# Patient Record
Sex: MALE | Race: WHITE | NOT HISPANIC OR LATINO | Employment: FULL TIME | URBAN - METROPOLITAN AREA
[De-identification: names, ages, dates, MRNs, and addresses within clinical notes are randomized per-mention and may not be internally consistent; named-entity substitution may affect disease eponyms.]

---

## 2018-01-12 ENCOUNTER — HOSPITAL ENCOUNTER (OUTPATIENT)
Dept: LAB | Facility: MEDICAL CENTER | Age: 53
End: 2018-01-12
Attending: FAMILY MEDICINE
Payer: COMMERCIAL

## 2018-01-12 LAB
ALBUMIN SERPL BCP-MCNC: 4.9 G/DL (ref 3.2–4.9)
ALBUMIN/GLOB SERPL: 1.8 G/DL
ALP SERPL-CCNC: 54 U/L (ref 30–99)
ALT SERPL-CCNC: 35 U/L (ref 2–50)
ANION GAP SERPL CALC-SCNC: 7 MMOL/L (ref 0–11.9)
AST SERPL-CCNC: 25 U/L (ref 12–45)
BASOPHILS # BLD AUTO: 1.4 % (ref 0–1.8)
BASOPHILS # BLD: 0.05 K/UL (ref 0–0.12)
BILIRUB SERPL-MCNC: 1.3 MG/DL (ref 0.1–1.5)
BUN SERPL-MCNC: 18 MG/DL (ref 8–22)
CALCIUM SERPL-MCNC: 10 MG/DL (ref 8.5–10.5)
CHLORIDE SERPL-SCNC: 106 MMOL/L (ref 96–112)
CHOLEST SERPL-MCNC: 189 MG/DL (ref 100–199)
CO2 SERPL-SCNC: 28 MMOL/L (ref 20–33)
CREAT SERPL-MCNC: 0.99 MG/DL (ref 0.5–1.4)
EOSINOPHIL # BLD AUTO: 0.03 K/UL (ref 0–0.51)
EOSINOPHIL NFR BLD: 0.8 % (ref 0–6.9)
ERYTHROCYTE [DISTWIDTH] IN BLOOD BY AUTOMATED COUNT: 41.7 FL (ref 35.9–50)
EST. AVERAGE GLUCOSE BLD GHB EST-MCNC: 91 MG/DL
GLOBULIN SER CALC-MCNC: 2.7 G/DL (ref 1.9–3.5)
GLUCOSE SERPL-MCNC: 88 MG/DL (ref 65–99)
HBA1C MFR BLD: 4.8 % (ref 0–5.6)
HCT VFR BLD AUTO: 48.5 % (ref 42–52)
HCV AB SER QL: NEGATIVE
HDLC SERPL-MCNC: 38 MG/DL
HGB BLD-MCNC: 16.6 G/DL (ref 14–18)
IMM GRANULOCYTES # BLD AUTO: 0.01 K/UL (ref 0–0.11)
IMM GRANULOCYTES NFR BLD AUTO: 0.3 % (ref 0–0.9)
LDLC SERPL CALC-MCNC: 127 MG/DL
LYMPHOCYTES # BLD AUTO: 1.44 K/UL (ref 1–4.8)
LYMPHOCYTES NFR BLD: 39.7 % (ref 22–41)
MCH RBC QN AUTO: 32.4 PG (ref 27–33)
MCHC RBC AUTO-ENTMCNC: 34.2 G/DL (ref 33.7–35.3)
MCV RBC AUTO: 94.7 FL (ref 81.4–97.8)
MONOCYTES # BLD AUTO: 0.32 K/UL (ref 0–0.85)
MONOCYTES NFR BLD AUTO: 8.8 % (ref 0–13.4)
NEUTROPHILS # BLD AUTO: 1.78 K/UL (ref 1.82–7.42)
NEUTROPHILS NFR BLD: 49 % (ref 44–72)
NRBC # BLD AUTO: 0 K/UL
NRBC BLD-RTO: 0 /100 WBC
PLATELET # BLD AUTO: 150 K/UL (ref 164–446)
PMV BLD AUTO: 11.7 FL (ref 9–12.9)
POTASSIUM SERPL-SCNC: 4.3 MMOL/L (ref 3.6–5.5)
PROT SERPL-MCNC: 7.6 G/DL (ref 6–8.2)
PSA SERPL-MCNC: 0.9 NG/ML (ref 0–4)
RBC # BLD AUTO: 5.12 M/UL (ref 4.7–6.1)
SODIUM SERPL-SCNC: 141 MMOL/L (ref 135–145)
TRIGL SERPL-MCNC: 119 MG/DL (ref 0–149)
TSH SERPL DL<=0.005 MIU/L-ACNC: 2.2 UIU/ML (ref 0.38–5.33)
WBC # BLD AUTO: 3.6 K/UL (ref 4.8–10.8)

## 2018-01-12 PROCEDURE — 81003 URINALYSIS AUTO W/O SCOPE: CPT

## 2018-01-12 PROCEDURE — 84270 ASSAY OF SEX HORMONE GLOBUL: CPT

## 2018-01-12 PROCEDURE — 84403 ASSAY OF TOTAL TESTOSTERONE: CPT

## 2018-01-12 PROCEDURE — 84402 ASSAY OF FREE TESTOSTERONE: CPT

## 2018-01-12 PROCEDURE — 86803 HEPATITIS C AB TEST: CPT

## 2018-01-12 PROCEDURE — 83036 HEMOGLOBIN GLYCOSYLATED A1C: CPT

## 2018-01-12 PROCEDURE — 84443 ASSAY THYROID STIM HORMONE: CPT

## 2018-01-12 PROCEDURE — 84153 ASSAY OF PSA TOTAL: CPT

## 2018-01-12 PROCEDURE — 80053 COMPREHEN METABOLIC PANEL: CPT

## 2018-01-12 PROCEDURE — 80061 LIPID PANEL: CPT

## 2018-01-12 PROCEDURE — 85025 COMPLETE CBC W/AUTO DIFF WBC: CPT

## 2018-01-12 PROCEDURE — 36415 COLL VENOUS BLD VENIPUNCTURE: CPT

## 2018-01-13 LAB
APPEARANCE UR: CLEAR
BILIRUB UR QL STRIP.AUTO: NEGATIVE
COLOR UR: YELLOW
GLUCOSE UR STRIP.AUTO-MCNC: NEGATIVE MG/DL
KETONES UR STRIP.AUTO-MCNC: NEGATIVE MG/DL
LEUKOCYTE ESTERASE UR QL STRIP.AUTO: NEGATIVE
MICRO URNS: NORMAL
NITRITE UR QL STRIP.AUTO: NEGATIVE
PH UR STRIP.AUTO: 5 [PH]
PROT UR QL STRIP: NEGATIVE MG/DL
RBC UR QL AUTO: NEGATIVE
SP GR UR STRIP.AUTO: 1.02
UROBILINOGEN UR STRIP.AUTO-MCNC: 0.2 MG/DL

## 2018-01-15 LAB
SHBG SERPL-SCNC: 53 NMOL/L (ref 11–80)
TESTOST FREE MFR SERPL: 1.4 % (ref 1.6–2.9)
TESTOST FREE SERPL-MCNC: 63 PG/ML (ref 47–244)
TESTOST SERPL-MCNC: 449 NG/DL (ref 300–890)

## 2020-05-20 SDOH — HEALTH STABILITY: MENTAL HEALTH: HOW MANY STANDARD DRINKS CONTAINING ALCOHOL DO YOU HAVE ON A TYPICAL DAY?: 3 OR 4

## 2020-05-20 NOTE — OR NURSING
PreAdmit Appointment: Patient given Preparing for your procedure handout. Patient instructed to continue regularly prescribed medications through day before surgery. Instructed to take the following medications the day of surgery with a sip of water per Anesthesia protocol: Patient takes no medications. Denies issues with anesthesia. Education provided. Covid scheduled for 5/26.

## 2020-05-26 ENCOUNTER — OFFICE VISIT (OUTPATIENT)
Dept: ADMISSIONS | Facility: MEDICAL CENTER | Age: 55
End: 2020-05-26
Attending: ORTHOPAEDIC SURGERY
Payer: COMMERCIAL

## 2020-05-26 DIAGNOSIS — Z01.812 PRE-OPERATIVE LABORATORY EXAMINATION: ICD-10-CM

## 2020-05-26 LAB — COVID ORDER STATUS COVID19: NORMAL

## 2020-05-26 PROCEDURE — C9803 HOPD COVID-19 SPEC COLLECT: HCPCS

## 2020-05-27 LAB
SARS-COV-2 RNA RESP QL NAA+PROBE: NOT DETECTED
SPECIMEN SOURCE: NORMAL

## 2020-05-29 ENCOUNTER — ANESTHESIA (OUTPATIENT)
Dept: SURGERY | Facility: MEDICAL CENTER | Age: 55
End: 2020-05-29
Payer: COMMERCIAL

## 2020-05-29 ENCOUNTER — HOSPITAL ENCOUNTER (OUTPATIENT)
Facility: MEDICAL CENTER | Age: 55
End: 2020-05-29
Attending: ORTHOPAEDIC SURGERY | Admitting: ORTHOPAEDIC SURGERY
Payer: COMMERCIAL

## 2020-05-29 ENCOUNTER — ANESTHESIA EVENT (OUTPATIENT)
Dept: SURGERY | Facility: MEDICAL CENTER | Age: 55
End: 2020-05-29
Payer: COMMERCIAL

## 2020-05-29 VITALS
RESPIRATION RATE: 16 BRPM | SYSTOLIC BLOOD PRESSURE: 103 MMHG | HEIGHT: 75 IN | OXYGEN SATURATION: 92 % | DIASTOLIC BLOOD PRESSURE: 64 MMHG | BODY MASS INDEX: 28.37 KG/M2 | WEIGHT: 228.18 LBS | HEART RATE: 63 BPM | TEMPERATURE: 97.5 F

## 2020-05-29 PROCEDURE — A9270 NON-COVERED ITEM OR SERVICE: HCPCS

## 2020-05-29 PROCEDURE — 502581 HCHG PACK, SHOULDER ARTHROSCOPY: Performed by: ORTHOPAEDIC SURGERY

## 2020-05-29 PROCEDURE — 160025 RECOVERY II MINUTES (STATS): Performed by: ORTHOPAEDIC SURGERY

## 2020-05-29 PROCEDURE — 700102 HCHG RX REV CODE 250 W/ 637 OVERRIDE(OP)

## 2020-05-29 PROCEDURE — 160029 HCHG SURGERY MINUTES - 1ST 30 MINS LEVEL 4: Performed by: ORTHOPAEDIC SURGERY

## 2020-05-29 PROCEDURE — 700101 HCHG RX REV CODE 250: Performed by: ORTHOPAEDIC SURGERY

## 2020-05-29 PROCEDURE — 501445 HCHG STAPLER, SKIN DISP: Performed by: ORTHOPAEDIC SURGERY

## 2020-05-29 PROCEDURE — 700111 HCHG RX REV CODE 636 W/ 250 OVERRIDE (IP): Performed by: ANESTHESIOLOGY

## 2020-05-29 PROCEDURE — 502240 HCHG MISC OR SUPPLY RC 0272: Performed by: ORTHOPAEDIC SURGERY

## 2020-05-29 PROCEDURE — 160046 HCHG PACU - 1ST 60 MINS PHASE II: Performed by: ORTHOPAEDIC SURGERY

## 2020-05-29 PROCEDURE — 160048 HCHG OR STATISTICAL LEVEL 1-5: Performed by: ORTHOPAEDIC SURGERY

## 2020-05-29 PROCEDURE — 500562 HCHG FIBERWIRE: Performed by: ORTHOPAEDIC SURGERY

## 2020-05-29 PROCEDURE — 160036 HCHG PACU - EA ADDL 30 MINS PHASE I: Performed by: ORTHOPAEDIC SURGERY

## 2020-05-29 PROCEDURE — A9270 NON-COVERED ITEM OR SERVICE: HCPCS | Performed by: ORTHOPAEDIC SURGERY

## 2020-05-29 PROCEDURE — 160035 HCHG PACU - 1ST 60 MINS PHASE I: Performed by: ORTHOPAEDIC SURGERY

## 2020-05-29 PROCEDURE — 500028 HCHG ARTHROWAND TURBOVAC 3.5/90 SUCT.: Performed by: ORTHOPAEDIC SURGERY

## 2020-05-29 PROCEDURE — 700111 HCHG RX REV CODE 636 W/ 250 OVERRIDE (IP): Performed by: ORTHOPAEDIC SURGERY

## 2020-05-29 PROCEDURE — 160041 HCHG SURGERY MINUTES - EA ADDL 1 MIN LEVEL 4: Performed by: ORTHOPAEDIC SURGERY

## 2020-05-29 PROCEDURE — 501838 HCHG SUTURE GENERAL: Performed by: ORTHOPAEDIC SURGERY

## 2020-05-29 PROCEDURE — 160009 HCHG ANES TIME/MIN: Performed by: ORTHOPAEDIC SURGERY

## 2020-05-29 PROCEDURE — 700102 HCHG RX REV CODE 250 W/ 637 OVERRIDE(OP): Performed by: ORTHOPAEDIC SURGERY

## 2020-05-29 PROCEDURE — 700101 HCHG RX REV CODE 250: Performed by: ANESTHESIOLOGY

## 2020-05-29 PROCEDURE — 160002 HCHG RECOVERY MINUTES (STAT): Performed by: ORTHOPAEDIC SURGERY

## 2020-05-29 PROCEDURE — 500144 HCHG CANNULA KIT, UNIV GREY-SHOULDER: Performed by: ORTHOPAEDIC SURGERY

## 2020-05-29 PROCEDURE — 700105 HCHG RX REV CODE 258: Performed by: ORTHOPAEDIC SURGERY

## 2020-05-29 RX ORDER — GABAPENTIN 300 MG/1
CAPSULE ORAL
Status: COMPLETED
Start: 2020-05-29 | End: 2020-05-29

## 2020-05-29 RX ORDER — ONDANSETRON 2 MG/ML
INJECTION INTRAMUSCULAR; INTRAVENOUS PRN
Status: DISCONTINUED | OUTPATIENT
Start: 2020-05-29 | End: 2020-05-29 | Stop reason: SURG

## 2020-05-29 RX ORDER — MORPHINE SULFATE 10 MG/ML
INJECTION, SOLUTION INTRAMUSCULAR; INTRAVENOUS
Status: DISCONTINUED | OUTPATIENT
Start: 2020-05-29 | End: 2020-05-29 | Stop reason: HOSPADM

## 2020-05-29 RX ORDER — SODIUM CHLORIDE, SODIUM LACTATE, POTASSIUM CHLORIDE, CALCIUM CHLORIDE 600; 310; 30; 20 MG/100ML; MG/100ML; MG/100ML; MG/100ML
INJECTION, SOLUTION INTRAVENOUS CONTINUOUS
Status: DISCONTINUED | OUTPATIENT
Start: 2020-05-29 | End: 2020-05-29 | Stop reason: HOSPADM

## 2020-05-29 RX ORDER — ROPIVACAINE HYDROCHLORIDE 5 MG/ML
INJECTION, SOLUTION EPIDURAL; INFILTRATION; PERINEURAL
Status: DISCONTINUED | OUTPATIENT
Start: 2020-05-29 | End: 2020-05-29 | Stop reason: HOSPADM

## 2020-05-29 RX ORDER — SODIUM CHLORIDE 9 MG/ML
INJECTION, SOLUTION INTRAMUSCULAR; INTRAVENOUS; SUBCUTANEOUS
Status: DISCONTINUED | OUTPATIENT
Start: 2020-05-29 | End: 2020-05-29 | Stop reason: HOSPADM

## 2020-05-29 RX ORDER — LIDOCAINE HYDROCHLORIDE 20 MG/ML
INJECTION, SOLUTION EPIDURAL; INFILTRATION; INTRACAUDAL; PERINEURAL PRN
Status: DISCONTINUED | OUTPATIENT
Start: 2020-05-29 | End: 2020-05-29 | Stop reason: SURG

## 2020-05-29 RX ORDER — OXYCODONE HCL 5 MG/5 ML
10 SOLUTION, ORAL ORAL
Status: DISCONTINUED | OUTPATIENT
Start: 2020-05-29 | End: 2020-05-29 | Stop reason: HOSPADM

## 2020-05-29 RX ORDER — ACETAMINOPHEN 500 MG
1000 TABLET ORAL ONCE
Status: DISCONTINUED | OUTPATIENT
Start: 2020-05-29 | End: 2020-05-29 | Stop reason: HOSPADM

## 2020-05-29 RX ORDER — ONDANSETRON 2 MG/ML
4 INJECTION INTRAMUSCULAR; INTRAVENOUS
Status: DISCONTINUED | OUTPATIENT
Start: 2020-05-29 | End: 2020-05-29 | Stop reason: HOSPADM

## 2020-05-29 RX ORDER — SUCCINYLCHOLINE/SOD CL,ISO/PF 200MG/10ML
SYRINGE (ML) INTRAVENOUS PRN
Status: DISCONTINUED | OUTPATIENT
Start: 2020-05-29 | End: 2020-05-29 | Stop reason: SURG

## 2020-05-29 RX ORDER — CEFAZOLIN SODIUM 1 G/3ML
INJECTION, POWDER, FOR SOLUTION INTRAMUSCULAR; INTRAVENOUS PRN
Status: DISCONTINUED | OUTPATIENT
Start: 2020-05-29 | End: 2020-05-29 | Stop reason: SURG

## 2020-05-29 RX ORDER — OXYCODONE HCL 5 MG/5 ML
5 SOLUTION, ORAL ORAL
Status: DISCONTINUED | OUTPATIENT
Start: 2020-05-29 | End: 2020-05-29 | Stop reason: HOSPADM

## 2020-05-29 RX ORDER — DEXAMETHASONE SODIUM PHOSPHATE 4 MG/ML
INJECTION, SOLUTION INTRA-ARTICULAR; INTRALESIONAL; INTRAMUSCULAR; INTRAVENOUS; SOFT TISSUE PRN
Status: DISCONTINUED | OUTPATIENT
Start: 2020-05-29 | End: 2020-05-29 | Stop reason: SURG

## 2020-05-29 RX ORDER — DIPHENHYDRAMINE HYDROCHLORIDE 50 MG/ML
12.5 INJECTION INTRAMUSCULAR; INTRAVENOUS
Status: DISCONTINUED | OUTPATIENT
Start: 2020-05-29 | End: 2020-05-29 | Stop reason: HOSPADM

## 2020-05-29 RX ORDER — LABETALOL HYDROCHLORIDE 5 MG/ML
5 INJECTION, SOLUTION INTRAVENOUS
Status: DISCONTINUED | OUTPATIENT
Start: 2020-05-29 | End: 2020-05-29 | Stop reason: HOSPADM

## 2020-05-29 RX ORDER — MEPERIDINE HYDROCHLORIDE 25 MG/ML
INJECTION INTRAMUSCULAR; INTRAVENOUS; SUBCUTANEOUS PRN
Status: DISCONTINUED | OUTPATIENT
Start: 2020-05-29 | End: 2020-05-29 | Stop reason: SURG

## 2020-05-29 RX ORDER — HALOPERIDOL 5 MG/ML
1 INJECTION INTRAMUSCULAR
Status: DISCONTINUED | OUTPATIENT
Start: 2020-05-29 | End: 2020-05-29 | Stop reason: HOSPADM

## 2020-05-29 RX ORDER — GABAPENTIN 300 MG/1
300 CAPSULE ORAL ONCE
Status: DISCONTINUED | OUTPATIENT
Start: 2020-05-29 | End: 2020-05-29 | Stop reason: HOSPADM

## 2020-05-29 RX ORDER — MEPERIDINE HYDROCHLORIDE 25 MG/ML
25 INJECTION INTRAMUSCULAR; INTRAVENOUS; SUBCUTANEOUS
Status: DISCONTINUED | OUTPATIENT
Start: 2020-05-29 | End: 2020-05-29 | Stop reason: HOSPADM

## 2020-05-29 RX ORDER — KETOROLAC TROMETHAMINE 30 MG/ML
INJECTION, SOLUTION INTRAMUSCULAR; INTRAVENOUS PRN
Status: DISCONTINUED | OUTPATIENT
Start: 2020-05-29 | End: 2020-05-29 | Stop reason: SURG

## 2020-05-29 RX ORDER — ACETAMINOPHEN 500 MG
TABLET ORAL
Status: COMPLETED
Start: 2020-05-29 | End: 2020-05-29

## 2020-05-29 RX ADMIN — SODIUM CHLORIDE, POTASSIUM CHLORIDE, SODIUM LACTATE AND CALCIUM CHLORIDE: 600; 310; 30; 20 INJECTION, SOLUTION INTRAVENOUS at 06:30

## 2020-05-29 RX ADMIN — ALFENTANIL HYDROCHLORIDE 250 MCG: 500 INJECTION, SOLUTION INTRAVENOUS at 07:06

## 2020-05-29 RX ADMIN — PROPOFOL 50 MG: 10 INJECTION, EMULSION INTRAVENOUS at 08:49

## 2020-05-29 RX ADMIN — MEPERIDINE HYDROCHLORIDE 25 MG: 25 INJECTION INTRAMUSCULAR; INTRAVENOUS; SUBCUTANEOUS at 08:37

## 2020-05-29 RX ADMIN — ONDANSETRON 4 MG: 2 INJECTION INTRAMUSCULAR; INTRAVENOUS at 08:43

## 2020-05-29 RX ADMIN — LIDOCAINE HYDROCHLORIDE 0.5 ML: 10 INJECTION, SOLUTION INFILTRATION; PERINEURAL at 06:09

## 2020-05-29 RX ADMIN — CEFAZOLIN 2 G: 1 INJECTION, POWDER, FOR SOLUTION INTRAVENOUS at 07:02

## 2020-05-29 RX ADMIN — POVIDONE-IODINE 15 ML: 10 SOLUTION TOPICAL at 06:20

## 2020-05-29 RX ADMIN — KETOROLAC TROMETHAMINE 30 MG: 30 INJECTION, SOLUTION INTRAMUSCULAR at 07:13

## 2020-05-29 RX ADMIN — PROPOFOL 100 MG: 10 INJECTION, EMULSION INTRAVENOUS at 07:06

## 2020-05-29 RX ADMIN — DEXAMETHASONE SODIUM PHOSPHATE 4 MG: 4 INJECTION, SOLUTION INTRAMUSCULAR; INTRAVENOUS at 07:12

## 2020-05-29 RX ADMIN — MEPERIDINE HYDROCHLORIDE 25 MG: 25 INJECTION INTRAMUSCULAR; INTRAVENOUS; SUBCUTANEOUS at 07:18

## 2020-05-29 RX ADMIN — ALFENTANIL HYDROCHLORIDE 750 MCG: 500 INJECTION, SOLUTION INTRAVENOUS at 07:04

## 2020-05-29 RX ADMIN — LIDOCAINE HYDROCHLORIDE 40 MG: 20 INJECTION, SOLUTION EPIDURAL; INFILTRATION; INTRACAUDAL; PERINEURAL at 07:04

## 2020-05-29 RX ADMIN — GABAPENTIN 300 MG: 300 CAPSULE ORAL at 06:19

## 2020-05-29 RX ADMIN — Medication 40 MG: at 07:06

## 2020-05-29 RX ADMIN — PROPOFOL 20 MG: 10 INJECTION, EMULSION INTRAVENOUS at 07:04

## 2020-05-29 RX ADMIN — ACETAMINOPHEN 1000 MG: 500 TABLET, FILM COATED ORAL at 06:18

## 2020-05-29 RX ADMIN — ROCURONIUM BROMIDE 5 MG: 10 INJECTION INTRAVENOUS at 07:04

## 2020-05-29 ASSESSMENT — PAIN SCALES - GENERAL: PAIN_LEVEL: 0

## 2020-05-29 NOTE — ANESTHESIA QCDR
2019 [facility] Clinical Data Registry (for Quality Improvement)     Postoperative nausea/vomiting risk protocol (Adult = 18 yrs and Pediatric 3-17 yrs)- (430 and 463)  General inhalation anesthetic (NOT TIVA) with PONV risk factors: No  Provision of anti-emetic therapy with at least 2 different classes of agents: N/A  Patient DID NOT receive anti-emetic therapy and reason is documented in Medical Record: N/A    Multimodal Pain Management- (477)  Non-emergent surgery AND patient age >= 18: Yes  Use of Multimodal Pain Management, two or more drugs and/or interventions, NOT including systemic opioids: Yes  Exception: Documented allergy to multiple classes of analgesics: N/A    Smoking Abstinence (404)  Patient is current smoker (cigarette, pipe, e-cig, marijuanna): No  Elective Surgery:   Abstinence instructions provided prior to day of surgery:   Patient abstained from smoking on day of surgery:     Pre-Op Beta-Blocker in Isolated CABG (44)  Isolated CABG AND patient age >= 18: No  Beta-blocker admin within 24 hours of surgical incision:   Exception:of medical reason(s) for not administering beta blocker within 24 hours prior to surgical incision (e.g., not  indicated,other medical reason):     PACU assessment of acute postoperative pain prior to Anesthesia Care End- Applies to Patients Age = 18- (ABG7)  Initial PACU pain score is which of the following: < 7/10  Patient unable to report pain score: N/A    Post-anesthetic transfer of care checklist/protocol to PACU/ICU- (426 and 427)  Upon conclusion of case, patient transferred to which of the following locations: PACU/Non-ICU  Use of transfer checklist/protocol: Yes  Exclusion: Service Performed in Patient Hospital Room (and thus did not require transfer): N/A  Unplanned admission to ICU related to anesthesia service up through end of PACU care- (MD51)  Unplanned admission to ICU (not initially anticipated at anesthesia start time): No

## 2020-05-29 NOTE — OR NURSING
1010 Pt arrived from PACU post   ARTHROSCOPY, SHOULDER- WITH DEBRIDEMENT  Left  General    DECOMPRESSION, SHOULDER, ARTHROSCOPIC- MINI OPEN SUBACROMIAL, LATERAL CORACOID DECOMPRESSION  Left  General    EXCISION, CLAVICLE, DISTAL  Left  General    ARTHROSCOPY, SHOULDER, WITH ROTATOR CUFF REPAIR  Left  General    ARTHROSCOPY, SHOULDER, WITH BICEPS TENODESIS- HOLLIDAY  Left      , report received. Pt breathing unlabored with clear lung sounds bilat. Denies pain or nausea at this time. Pt dressing @ BS with assistance.     1033 Discharge instructions and medications gone over with Pt and ride. All questions answered. Pt meets DC criteria. PIV DC'd. Pt transported to POV via WC in stable condition.

## 2020-05-29 NOTE — OR NURSING
0908 To PACU from OR via gurney, side rails up x 2 for safety, lungs clear bilaterally, scds on patient and machine operational, dressing CDI to L shoulder with game ready polar ice placed to L shoulder with immobilizer in place. +2 L radial pulse with pink/warm fingers with <3 sec cap refill. CMS intact to LUE. HOB elevated to 30 degrees and pt assisted to position for comfort. Denies pain or nausea.  0920 Pt remains awake and talking with RN. Denies pain or nausea. Instructed DB/C; demonstrated understanding.   0935 Pt remains awake and talking with RN. Denies pain or nausea.   0950 Pt reports pain as 1/10 to R shoulder anterior area but easily tolerable. Denies nausea; tolerating sips of water.   1005 Pain remains 1/10 and tolerable. Denies nausea. Meets criteria for transfer to stage II.

## 2020-05-29 NOTE — ANESTHESIA TIME REPORT
Anesthesia Start and Stop Event Times     Date Time Event    5/29/2020 0651 Ready for Procedure     0702 Anesthesia Start     0910 Anesthesia Stop        Responsible Staff  05/29/20    Name Role Begin End    Cam Isidro M.D. Anesth 0702 0910        Preop Diagnosis (Free Text):  Pre-op Diagnosis     IMPINGEMENT SYNDROME OF LEFT SHOUDLER        Preop Diagnosis (Codes):    Post op Diagnosis  Complete rotator cuff tear of left shoulder      Premium Reason  Non-Premium    Comments:

## 2020-05-29 NOTE — OP REPORT
Date of Surgery:  5/29/2020    PREOPERATIVE DIAGNOSES   Left shoulder pain.  Left shoulder impingement.  Left shoulder acromioclavicular arthritis.  Left shoulder superior labral tear  Left shoulder coracohumeral impingement  Left shoulder rotator cuff tear     POSTOPERATIVE DIAGNOSES:  Left shoulder pain.  Left shoulder impingement.  Left shoulder acromioclavicular arthritis.  Left shoulder severe biceps tendinopathy in the bicipital groove   left shoulder coracohumeral impingement  Left shoulder rotator cuff tear    PROCEDURES:  Left shoulder arthroscopic debridement of the labrum.  Left shoulder arthroscopic partial synovectomy.  Left shoulder open distal clavicle excision.  Left shoulder open subacromial decompression.  Left shoulder open biceps tenodesis.  Left shoulder rotator cuff repair  Left shoulder lateral coracoid decompression      SURGEON:  Chris Stanley MD.     ASSISTANT: Jesus Sosa     ANESTHESIA:  General.     ANESTHESIOLOGIST: Dr. Cam noel      FINDINGS:  Stable subacromial decompression.  Stable distal clavicle excision.  Stable arthroscopic labral debridement.  Noncompetent  tendinopathy of the biceps in the bicipital groove  Stable biceps tenodesis.  Stable lateral coracoid decompression  Stable rotator cuff repair     COMPLICATIONS:  None.       PROCEDURE IN DETAIL:  The patient was taken to the operating room where he    underwent general anesthetic and then was positioned on the beach-chair    apparatus.  All bony prominences padded.  Left upper extremity was isolated,    prepped using Hibiclens, alcohol, ChloraPrep, draped using sterile technique.     Perioperative antibiotics were given.  An appropriate time-out was    undertaken.  The spider was utilized for stabilization.  Surgery initiated    with instilling 30 mL of saline at the joint and then placement of a posterior   and an anterior portal under direct localization.     Diagnostic arthroscopy showed intact articular  surfaces.     The labrum had significant degenerative changes and therefore was treated with   a shaver chondroplasty and Oratec/Arthrocare stabilization.The superior labrum was stable, but the biceps tendon had marked tendinopathy in the bicipital groove requiring biceps tenodesis.  For this reason, a decision was undertaken to tenotomize the biceps intraarticularly.  This was done and stabilized with the shaver and Arthrocare.    Lateral coracoid was then addressed using Oratec/Arthrocare and shaver chondroplasty.     At this point, the cannulas were removed.  The portals were closed using interrupted nylon and the shoulder addressed with a mini open approach.    Skin and subcutaneous tissue were incised.      Subperiosteal dissection of the distal clavicle was followed by resection of the distal 1 cm after elevation of the periosteum.  This was treated with hemostasis, Bovie, periarticular anesthetization and then the fascia over this repaired after distal clavicle    excision using 0 Monocryl.     The deltoid was split in line with the anterolateral corner and subacromial    bursa was identified and excised.  The subacromial decompression had been accomplished with the undersurface decompression of the CA ligament insertion and then an osteotomy of the acromion using an oscillating saw and hand rasp.    The rotator cuff was assessed and noted to have a  tear .  A small attritional and insertional tear of the supraspinatus was identified.  This was treated with debridement, and roberto decompression of the greater tuberosity.  Followed by suture of the cuff with modified walker manisha stitch with #2 fiberwire over bone bridges.     The biceps tendon was addressed through the bicipital groove with incision and   creation of a roberto hole proximally.  The bicipital groove was burred as well.  The suture was passed over bone bridges anteriorly, stitched into the biceps   tendon with Krarodriguez whipstitch and then subsequent  repair of the biceps into    the keyhole with a tenodesis and external oversew with #1 FiberWire.     Thorough irrigation was undertaken and anesthetization was undertaken using    ropivacaine, morphine and Toradol.  This block was done at the origination of the surgery with the subacromial decompression.  At the completion of the procedure a repeat Exparel block was undertaken.  The patient was then placed into arm at    the side position and closure undertaken after hemostasis obtained.     The deltoid was repaired using 0 Monocryl.  The subcutaneous 0 and 3-0    Monocryl were utilized.  Steri-Strips were used on the skin.  The patient    placed into a sterile bandage, awoken from his anesthetic and taken to the    recovery room, tolerated the procedure very well with no signs of    complications.        ____________________________________     GURJIT SOARES MD

## 2020-05-29 NOTE — ANESTHESIA PROCEDURE NOTES
Airway    Date/Time: 5/29/2020 7:07 AM  Performed by: Cam Isidro M.D.  Authorized by: Cam Isidro M.D.         Final Airway Type:  Supraglottic airway  Final Supraglottic Airway:  Standard LMA    SGA Size:  4

## 2020-05-29 NOTE — ANESTHESIA POSTPROCEDURE EVALUATION
Patient: Fady Thornton    Procedure Summary     Date:  05/29/20 Room / Location:   OR  / SURGERY [facility]    Anesthesia Start:  0702 Anesthesia Stop:  0910    Procedures:       ARTHROSCOPY, SHOULDER- WITH DEBRIDEMENT (Left Shoulder)      DECOMPRESSION, SHOULDER, ARTHROSCOPIC- MINI OPEN SUBACROMIAL, LATERAL CORACOID DECOMPRESSION (Left Shoulder)      EXCISION, CLAVICLE, DISTAL (Left Shoulder)      ARTHROSCOPY, SHOULDER, WITH ROTATOR CUFF REPAIR (Left Shoulder)      ARTHROSCOPY, SHOULDER, WITH BICEPS TENODESIS- HOLLIDAY (Left Shoulder) Diagnosis:  (IMPINGEMENT SYNDROME OF LEFT SHOUDLER)    Surgeon:  Chris Stanley M.D. Responsible Provider:  Cam Isidro M.D.    Anesthesia Type:  general ASA Status:  1          Final Anesthesia Type: general  Last vitals  BP   Blood Pressure: 114/74    Temp   36 °C (96.8 °F)    Pulse   Pulse: 66   Resp   16    SpO2   96 %      Anesthesia Post Evaluation    Patient location during evaluation: PACU  Patient participation: complete - patient participated  Level of consciousness: awake and alert  Pain score: 0    Airway patency: patent  Anesthetic complications: no  Cardiovascular status: hemodynamically stable  Respiratory status: acceptable  Hydration status: euvolemic    PONV: none           Nurse Pain Score: 0 (NPRS)

## 2020-05-29 NOTE — ANESTHESIA PREPROCEDURE EVALUATION
Relevant Problems   No relevant active problems       Physical Exam    Airway   Mallampati: II  TM distance: >3 FB  Neck ROM: full       Cardiovascular - normal exam  Rhythm: regular  Rate: normal     Dental - normal exam           Pulmonary - normal exam  Breath sounds clear to auscultation     Abdominal    Neurological - normal exam                 Anesthesia Plan    ASA 1       Plan - general       Airway plan will be LMA        Induction: intravenous    Postoperative Plan: Postoperative administration of opioids is intended.    Pertinent diagnostic labs and testing reviewed    Informed Consent:    Anesthetic plan and risks discussed with patient.    Use of blood products discussed with: patient whom consented to blood products.

## 2020-05-29 NOTE — DISCHARGE INSTRUCTIONS
ACTIVITY: Rest and take it easy for the first 24 hours.  A responsible adult is recommended to remain with you during that time.  It is normal to feel sleepy.  We encourage you to not do anything that requires balance, judgment or coordination.    MILD FLU-LIKE SYMPTOMS ARE NORMAL. YOU MAY EXPERIENCE GENERALIZED MUSCLE ACHES, THROAT IRRITATION, HEADACHE AND/OR SOME NAUSEA.    FOR 24 HOURS DO NOT:  Drive, operate machinery or run household appliances.  Drink beer or alcoholic beverages.   Make important decisions or sign legal documents.    SPECIAL INSTRUCTIONS:   1. Discharge home   2. Discharge home on home meds   3. Discharge home on Percocet and aspirin   4. Outpatient PT to begin in 2-4 days.   5.  Follow up [facility] 10-14 days   6. Call for Fevers, drainage, redness, shortness of breath, or increasing extremity swelling particularly in the calf.   7. Start Aspirin 325mg per day. Use for thirty days.   8. Remove bandage in 5 days.  Replace sooner for drainage and notify office.   9.  Wear FLORENTINO (compressive stocking) for 2 weeks on both lower extremities.   10.. Sling to the left shoulder   Exparel armband to right wrist; no further local anesthestic can be received for next 96 hours.     DIET: To avoid nausea, slowly advance diet as tolerated, avoiding spicy or greasy foods for the first day.  Add more substantial food to your diet according to your physician's instructions.  INCREASE FLUIDS AND FIBER TO AVOID CONSTIPATION.    FOLLOW-UP APPOINTMENT:  A follow-up appointment should be arranged with your doctor in office as scheduled; call to schedule.    You should CALL YOUR PHYSICIAN if you develop:  Fever greater than 101 degrees F.  Pain not relieved by medication, or persistent nausea or vomiting.  Excessive bleeding (blood soaking through dressing) or unexpected drainage from the wound.  Extreme redness or swelling around the incision site, drainage of pus or foul smelling drainage.  Inability to  urinate or empty your bladder within 8 hours.  Problems with breathing or chest pain.    You should call 911 if you develop problems with breathing or chest pain.  If you are unable to contact your doctor or surgical center, you should go to the nearest emergency room or urgent care center.  Dr Stanley's telephone #: [phone]    If any questions arise, call your doctor.  If your doctor is not available, please feel free to call the Surgical Center at [phone].  The Center is open Monday through Friday from 7AM to 7PM.  You can also call the [facility] HOTLINE open 24 hours/day, 7 days/week and speak to a nurse at [phone], or toll free at [phone].    A registered nurse may call you a few days after your surgery to see how you are doing after your procedure.    MEDICATIONS: Resume taking daily medication.  Take prescribed pain medication with food.  If no medication is prescribed, you may take non-aspirin pain medication if needed.  PAIN MEDICATION CAN BE VERY CONSTIPATING.  Take a stool softener or laxative such as senokot, pericolace, or milk of magnesia if needed.    Prescription given for Home.  Last pain medication given at _________.    If your physician has prescribed pain medication that includes Acetaminophen (Tylenol), do not take additional Acetaminophen (Tylenol) while taking the prescribed medication.    Depression / Suicide Risk    As you are discharged from this [facility] facility, it is important to learn how to keep safe from harming yourself.    Recognize the warning signs:  · Abrupt changes in personality, positive or negative- including increase in energy   · Giving away possessions  · Change in eating patterns- significant weight changes-  positive or negative  · Change in sleeping patterns- unable to sleep or sleeping all the time   · Unwillingness or inability to communicate  · Depression  · Unusual sadness, discouragement and loneliness  · Talk of wanting to  die  · Neglect of personal appearance   · Rebelliousness- reckless behavior  · Withdrawal from people/activities they love  · Confusion- inability to concentrate     If you or a loved one observes any of these behaviors or has concerns about self-harm, here's what you can do:  · Talk about it- your feelings and reasons for harming yourself  · Remove any means that you might use to hurt yourself (examples: pills, rope, extension cords, firearm)  · Get professional help from the community (Mental Health, Substance Abuse, psychological counseling)  · Do not be alone:Call your Safe Contact- someone whom you trust who will be there for you.  · Call your local CRISIS HOTLINE 914-6558 or 051-365-9108  · Call your local Children's Mobile Crisis Response Team [place] (088) 079-1160 or www.[employer]  · Call the toll free National Suicide Prevention Hotlines   · National Suicide Prevention Lifeline 432-849-XHVX (4577)  · National Hope Line Network 800-SUICIDE (706-9251)

## 2020-07-27 ENCOUNTER — OFFICE VISIT (OUTPATIENT)
Dept: URGENT CARE | Facility: CLINIC | Age: 55
End: 2020-07-27
Payer: COMMERCIAL

## 2020-07-27 VITALS
TEMPERATURE: 98.8 F | RESPIRATION RATE: 16 BRPM | HEIGHT: 75 IN | WEIGHT: 220 LBS | OXYGEN SATURATION: 96 % | HEART RATE: 62 BPM | BODY MASS INDEX: 27.35 KG/M2 | SYSTOLIC BLOOD PRESSURE: 96 MMHG | DIASTOLIC BLOOD PRESSURE: 58 MMHG

## 2020-07-27 DIAGNOSIS — H69.91 DYSFUNCTION OF RIGHT EUSTACHIAN TUBE: ICD-10-CM

## 2020-07-27 PROCEDURE — 99203 OFFICE O/P NEW LOW 30 MIN: CPT | Performed by: FAMILY MEDICINE

## 2020-07-27 RX ORDER — METHYLPREDNISOLONE 4 MG/1
TABLET ORAL
Qty: 1 EACH | Refills: 0 | Status: SHIPPED
Start: 2020-07-27 | End: 2021-06-04

## 2020-07-27 SDOH — HEALTH STABILITY: MENTAL HEALTH: HOW MANY STANDARD DRINKS CONTAINING ALCOHOL DO YOU HAVE ON A TYPICAL DAY?: NOT ASKED

## 2020-07-27 NOTE — PROGRESS NOTES
Subjective:      Fady Thornton is a 54 y.o. male who presents with Ringing in Ear (bilateral ears, ringing sensation, pressure x 2-3  weeks)      - This is a pleasant and non toxic appearing 54 y.o. male with c/o Rt ear pressure on/off x 2 wks. Ringing at times. No trauma or NVFC            ALLERGIES:  Patient has no known allergies.     PMH:  Past Medical History:   Diagnosis Date   • Pain     Left shoulder pain        PSH:  Past Surgical History:   Procedure Laterality Date   • PB SHLDR ARTHROSCOP,PART ACROMIOPLAS Left 5/29/2020    Procedure: DECOMPRESSION, SHOULDER, ARTHROSCOPIC- MINI OPEN SUBACROMIAL, LATERAL CORACOID DECOMPRESSION;  Surgeon: Chris Stanley M.D.;  Location: [facility];  Service: Orthopedics   • PB SHLDR ARTHROSCOP,SURG,W/ROTAT CUFF REPB Left 5/29/2020    Procedure: ARTHROSCOPY, SHOULDER, WITH ROTATOR CUFF REPAIR;  Surgeon: Chris Stanley M.D.;  Location: [facility];  Service: Orthopedics   • PB ARTHROSCOPY SHOULDER SURGICAL BICEPS TENODES* Left 5/29/2020    Procedure: ARTHROSCOPY, SHOULDER, WITH BICEPS TENODESIS- HOLLIDAY;  Surgeon: Chris Stanley M.D.;  Location: [facility];  Service: Orthopedics   • SHOULDER ARTHROSCOPY Left 5/29/2020    Procedure: ARTHROSCOPY, SHOULDER- WITH DEBRIDEMENT;  Surgeon: Chris Stanley M.D.;  Location: [facility];  Service: Orthopedics   • CLAVICLE DISTAL EXCISION Left 5/29/2020    Procedure: EXCISION, CLAVICLE, DISTAL;  Surgeon: Chris Stanley M.D.;  Location: [facility];  Service: Orthopedics   • ARTHROPLASTY Left 2017   • SHOULDER ARTHROSCOPY Right 2015   • CHOLECYSTECTOMY  2010       MEDS:    Current Outpatient Medications:   •  OMEPRAZOLE PO, Take  by mouth., Disp: , Rfl:   •  methylPREDNISolone (MEDROL DOSEPAK) 4 MG Tablet Therapy Pack, Use as directed, Disp: 1 Each, Rfl: 0    ** I have documented what I find to be significant in regards to past medical, social, family and  "surgical history  in my HPI or under PMH/PSH/FH review section, otherwise it is contributory **           HPI    Review of Systems   HENT: Positive for ear pain.    All other systems reviewed and are negative.         Objective:     BP (!) 96/58 (BP Location: Right arm, Patient Position: Sitting, BP Cuff Size: Adult long)   Pulse 62   Temp 37.1 °C (98.8 °F) (Temporal)   Resp 16   Ht 1.905 m (6' 3\")   Wt 99.8 kg (220 lb)   SpO2 96%   BMI 27.50 kg/m²      Physical Exam  Vitals signs and nursing note reviewed.   Constitutional:       General: He is not in acute distress.     Appearance: He is well-developed. He is not diaphoretic.   HENT:      Head: Normocephalic and atraumatic.      Right Ear: Tympanic membrane, ear canal and external ear normal. There is no impacted cerumen.      Left Ear: Tympanic membrane, ear canal and external ear normal. There is no impacted cerumen.      Mouth/Throat:      Mouth: Mucous membranes are moist.      Pharynx: Oropharynx is clear.   Eyes:      General: No scleral icterus.     Conjunctiva/sclera: Conjunctivae normal.   Cardiovascular:      Heart sounds: Normal heart sounds. No murmur.   Pulmonary:      Effort: Pulmonary effort is normal. No respiratory distress.      Breath sounds: Normal breath sounds.   Skin:     Coloration: Skin is not pale.      Findings: No rash.   Neurological:      Mental Status: He is alert.      Motor: No abnormal muscle tone.   Psychiatric:         Mood and Affect: Mood normal.         Behavior: Behavior normal.         Judgment: Judgment normal.                 Assessment/Plan:           1. Dysfunction of right eustachian tube  methylPREDNISolone (MEDROL DOSEPAK) 4 MG Tablet Therapy Pack    REFERRAL TO ENT       - OTC Flonase and Sudafed   - E.R. precautions discussed     Dx & d/c instructions discussed w/ patient and/or family members.     Follow up with PCP (or UC if PCP is unavailable) in 2-3 days to make sure improving and no further additional " treatment needed, ER if not improving or feeling/getting worse.    Any realistic and/or common medication side effects that may have been given today(i.e. Rash, GI upset/constipation, sedation, elevation of BP or blood sugars) reviewed.

## 2021-03-15 DIAGNOSIS — Z23 NEED FOR VACCINATION: ICD-10-CM

## 2021-06-03 ENCOUNTER — OFFICE VISIT (OUTPATIENT)
Dept: URGENT CARE | Facility: CLINIC | Age: 56
End: 2021-06-03
Payer: COMMERCIAL

## 2021-06-03 VITALS
DIASTOLIC BLOOD PRESSURE: 62 MMHG | OXYGEN SATURATION: 95 % | HEART RATE: 95 BPM | RESPIRATION RATE: 16 BRPM | WEIGHT: 217 LBS | TEMPERATURE: 97.4 F | SYSTOLIC BLOOD PRESSURE: 88 MMHG | BODY MASS INDEX: 26.98 KG/M2 | HEIGHT: 75 IN

## 2021-06-03 DIAGNOSIS — R19.7 DIARRHEA, UNSPECIFIED TYPE: ICD-10-CM

## 2021-06-03 DIAGNOSIS — R53.81 MALAISE AND FATIGUE: ICD-10-CM

## 2021-06-03 DIAGNOSIS — R53.83 MALAISE AND FATIGUE: ICD-10-CM

## 2021-06-03 PROCEDURE — 99213 OFFICE O/P EST LOW 20 MIN: CPT | Performed by: PHYSICIAN ASSISTANT

## 2021-06-03 RX ORDER — AMOXICILLIN AND CLAVULANATE POTASSIUM 500; 125 MG/1; MG/1
TABLET, FILM COATED ORAL
COMMUNITY
Start: 2021-03-16 | End: 2021-06-04

## 2021-06-04 ASSESSMENT — ENCOUNTER SYMPTOMS
MYALGIAS: 1
CONSTIPATION: 0
SHORTNESS OF BREATH: 0
WEIGHT LOSS: 0
DIZZINESS: 0
CHILLS: 1
HEADACHES: 1
BLOOD IN STOOL: 0
HEARTBURN: 0
DIARRHEA: 1
PALPITATIONS: 0

## 2021-06-04 NOTE — PATIENT INSTRUCTIONS
Viral Gastroenteritis, Adult    Viral gastroenteritis is also known as the stomach flu. This condition may affect your stomach, small intestine, and large intestine. It can cause sudden watery diarrhea, fever, and vomiting. This condition is caused by many different viruses. These viruses can be passed from person to person very easily (are contagious).  Diarrhea and vomiting can make you feel weak and cause you to become dehydrated. You may not be able to keep fluids down. Dehydration can make you tired and thirsty, cause you to have a dry mouth, and decrease how often you urinate. It is important to replace the fluids that you lose from diarrhea and vomiting.  What are the causes?  Gastroenteritis is caused by many viruses, including rotavirus and norovirus. Norovirus is the most common cause in adults. You can get sick after being exposed to the viruses from other people. You can also get sick by:  · Eating food, drinking water, or touching a surface contaminated with one of these viruses.  · Sharing utensils or other personal items with an infected person.  What increases the risk?  You are more likely to develop this condition if you:  · Have a weak body defense system (immune system).  · Live with one or more children who are younger than 2 years old.  · Live in a nursing home.  · Travel on cruise ships.  What are the signs or symptoms?  Symptoms of this condition start suddenly 1-3 days after exposure to a virus. Symptoms may last for a few days or for as long as a week. Common symptoms include watery diarrhea and vomiting. Other symptoms include:  · Fever.  · Headache.  · Fatigue.  · Pain in the abdomen.  · Chills.  · Weakness.  · Nausea.  · Muscle aches.  · Loss of appetite.  How is this diagnosed?  This condition is diagnosed with a medical history and physical exam. You may also have a stool test to check for viruses or other infections.  How is this treated?  This condition typically goes away on its  own. The focus of treatment is to prevent dehydration and restore lost fluids (rehydration). This condition may be treated with:  · An oral rehydration solution (ORS) to replace important salts and minerals (electrolytes) in your body. Take this if told by your health care provider. This is a drink that is sold at pharmacies and retail stores.  · Medicines to help with your symptoms.  · Probiotic supplements to reduce symptoms of diarrhea.  · Fluids given through an IV, if dehydration is severe.  Older adults and people with other diseases or a weak immune system are at higher risk for dehydration.  Follow these instructions at home:    Eating and drinking    · Take an ORS as told by your health care provider.  · Drink clear fluids in small amounts as you are able. Clear fluids include:  ? Water.  ? Ice chips.  ? Diluted fruit juice.  ? Low-calorie sports drinks.  · Drink enough fluid to keep your urine pale yellow.  · Eat small amounts of healthy foods every 3-4 hours as you are able. This may include whole grains, fruits, vegetables, lean meats, and yogurt.  · Avoid fluids that contain a lot of sugar or caffeine, such as energy drinks, sports drinks, and soda.  · Avoid spicy or fatty foods.  · Avoid alcohol.  General instructions  · Wash your hands often, especially after having diarrhea or vomiting. If soap and water are not available, use hand .  · Make sure that all people in your household wash their hands well and often.  · Take over-the-counter and prescription medicines only as told by your health care provider.  · Rest at home while you recover.  · Watch your condition for any changes.  · Take a warm bath to relieve any burning or pain from frequent diarrhea episodes.  · Keep all follow-up visits as told by your health care provider. This is important.  Contact a health care provider if you:  · Cannot keep fluids down.  · Have symptoms that get worse.  · Have new symptoms.  · Feel light-headed or  dizzy.  · Have muscle cramps.  Get help right away if you:  · Have chest pain.  · Feel extremely weak or you faint.  · See blood in your vomit.  · Have vomit that looks like coffee grounds.  · Have bloody or black stools or stools that look like tar.  · Have a severe headache, a stiff neck, or both.  · Have a rash.  · Have severe pain, cramping, or bloating in your abdomen.  · Have trouble breathing or you are breathing very quickly.  · Have a fast heartbeat.  · Have skin that feels cold and clammy.  · Feel confused.  · Have pain when you urinate.  · Have signs of dehydration, such as:  ? Dark urine, very little urine, or no urine.  ? Cracked lips.  ? Dry mouth.  ? Sunken eyes.  ? Sleepiness.  ? Weakness.  Summary  · Viral gastroenteritis is also known as the stomach flu. It can cause sudden watery diarrhea, fever, and vomiting.  · This condition can be passed from person to person very easily (is contagious).  · Take an ORS if told by your health care provider. This is a drink that is sold at pharmacies and retail stores.  · Wash your hands often, especially after having diarrhea or vomiting. If soap and water are not available, use hand .  This information is not intended to replace advice given to you by your health care provider. Make sure you discuss any questions you have with your health care provider.  Document Released: 12/18/2006 Document Revised: 10/23/2019 Document Reviewed: 10/23/2019  [employer] Patient Education © 2020 [employer]

## 2021-06-04 NOTE — PROGRESS NOTES
Subjective:   Fady Thornton is a 55 y.o. male who presents for Fatigue (x 5 days, lethargy), Fever (x 5 days, up to 100.5), Chills (x 3 days), and Nausea (x 3 days)      Diarrhea   This is a new problem. The current episode started in the past 7 days. The problem occurs 5 to 10 times per day. The problem has been gradually improving. The stool consistency is described as watery. Associated symptoms include chills, headaches and myalgias. Pertinent negatives include no weight loss. There are no known risk factors. He has tried nothing for the symptoms.       Review of Systems   Constitutional: Positive for chills and malaise/fatigue. Negative for weight loss.   Respiratory: Negative for shortness of breath.    Cardiovascular: Negative for palpitations.   Gastrointestinal: Positive for diarrhea. Negative for blood in stool, constipation, heartburn and melena.   Musculoskeletal: Positive for myalgias.   Skin: Negative for itching.   Neurological: Positive for headaches. Negative for dizziness.   All other systems reviewed and are negative.      Medications:    • amoxicillin-clavulanate Tabs  • methylPREDNISolone Tbpk  • OMEPRAZOLE PO    Allergies: Patient has no known allergies.    Problem List: Fady Thornton does not have a problem list on file.    Surgical History:  Past Surgical History:   Procedure Laterality Date   • PB SHLDR ARTHROSCOP,PART ACROMIOPLAS Left 5/29/2020    Procedure: DECOMPRESSION, SHOULDER, ARTHROSCOPIC- MINI OPEN SUBACROMIAL, LATERAL CORACOID DECOMPRESSION;  Surgeon: Chris Stanley M.D.;  Location: SURGERY [facility];  Service: Orthopedics   • PB SHLDR ARTHROSCOP,SURG,W/ROTAT CUFF REPB Left 5/29/2020    Procedure: ARTHROSCOPY, SHOULDER, WITH ROTATOR CUFF REPAIR;  Surgeon: Chris Stanley M.D.;  Location: SURGERY [facility];  Service: Orthopedics   • PB ARTHROSCOPY SHOULDER SURGICAL BICEPS TENODES* Left 5/29/2020    Procedure: ARTHROSCOPY, SHOULDER, WITH BICEPS  "TENODESIS- HOLLIDAY;  Surgeon: Chris Stanley M.D.;  Location: SURGERY [facility];  Service: Orthopedics   • SHOULDER ARTHROSCOPY Left 5/29/2020    Procedure: ARTHROSCOPY, SHOULDER- WITH DEBRIDEMENT;  Surgeon: Chris Stanley M.D.;  Location: [facility];  Service: Orthopedics   • CLAVICLE DISTAL EXCISION Left 5/29/2020    Procedure: EXCISION, CLAVICLE, DISTAL;  Surgeon: Chris Stanley M.D.;  Location: SURGERY [facility];  Service: Orthopedics   • ARTHROPLASTY Left 2017   • SHOULDER ARTHROSCOPY Right 2015   • CHOLECYSTECTOMY  2010       Past Social Hx: Fady Thornton  reports that he has never smoked. He has never used smokeless tobacco. He reports previous alcohol use. He reports that he does not use drugs.     Past Family Hx:  Fady Thornton family history is not on file.     Problem list, medications, and allergies reviewed by myself today in Epic.     Objective:     Blood Pressure (Abnormal) 88/62 (BP Location: Left arm, Patient Position: Sitting, BP Cuff Size: Adult)   Pulse 95   Temperature 36.3 °C (97.4 °F) (Temporal)   Respiration 16   Height 1.905 m (6' 3\")   Weight 98.4 kg (217 lb)   Oxygen Saturation 95%   Body Mass Index 27.12 kg/m²     Physical Exam  Vitals reviewed.   Constitutional:       Appearance: He is well-developed.   HENT:      Head: Normocephalic and atraumatic.      Right Ear: External ear normal.      Left Ear: External ear normal.      Nose: Nose normal.   Cardiovascular:      Rate and Rhythm: Normal rate and regular rhythm.      Heart sounds: Normal heart sounds.   Pulmonary:      Effort: Pulmonary effort is normal.      Breath sounds: Normal breath sounds.   Abdominal:      General: Bowel sounds are normal. There is no distension.      Palpations: Abdomen is soft. There is no mass.      Tenderness: There is no abdominal tenderness. There is no right CVA tenderness, left CVA tenderness, guarding or rebound.      Hernia: No hernia is present. "   Musculoskeletal:      Cervical back: Normal range of motion and neck supple.   Skin:     General: Skin is warm and dry.   Neurological:      Mental Status: He is alert and oriented to person, place, and time.   Psychiatric:         Behavior: Behavior normal.         Thought Content: Thought content normal.         Judgment: Judgment normal.         Assessment/Plan:     Medical Decision Making/Comments     Pt is a 55 yr old mal3 who presents for evaluation of vomiting and diarrhea.  Pt has been having symptoms for 5 days with associated symptoms of fatigue, malaise, headache.  Pt is having several stools a day with a watery consistency.  Pt denies melanotic/blood/mucus in BM, sick contacts,  recent travel, camping, antibiotics/recent hospitalization.  Pt is tolerated PO liquids.  Mild hypotension present but appears to be his baseline.  There is no tachycardia of vital signs.  Patient appears well and non-toxic.  Skin turgor is normal with no delayed tenting.  Oral mucosa is wet.  Abdominal exam shows no focal pain or signs of peritonitis. Likely viral etiology.  If symptoms persist labs are ordered for further evaluation.  ED precautions discussed..    Diagnosis differential includes but not limited to:    Common:  Gastroenteritis viral vs bacterial, cholecystitis, appendicitis.  Uncommon: IBD, obstructive/intussusception, ischemic colitis, c-diff, HUS.       Diagnosis and associated orders     1. Diarrhea, unspecified type  CBC WITH DIFFERENTIAL    Comp Metabolic Panel    CULTURE STOOL   2. Malaise and fatigue  CBC WITH DIFFERENTIAL    Comp Metabolic Panel    CULTURE STOOL              Differential diagnosis, natural history, supportive care, and indications for immediate follow-up discussed.    Advised the patient to follow-up with the primary care physician for recheck, reevaluation, and consideration of further management.    Please note that this dictation was created using voice recognition software. I have  made a reasonable attempt to correct obvious errors, but I expect that there are errors of grammar and possibly content that I did not discover before finalizing the note.

## 2022-08-23 ENCOUNTER — APPOINTMENT (RX ONLY)
Dept: URBAN - METROPOLITAN AREA CLINIC 35 | Facility: CLINIC | Age: 57
Setting detail: DERMATOLOGY
End: 2022-08-23

## 2022-08-23 DIAGNOSIS — L57.0 ACTINIC KERATOSIS: ICD-10-CM

## 2022-08-23 DIAGNOSIS — Z71.89 OTHER SPECIFIED COUNSELING: ICD-10-CM

## 2022-08-23 DIAGNOSIS — L81.4 OTHER MELANIN HYPERPIGMENTATION: ICD-10-CM

## 2022-08-23 DIAGNOSIS — L82.1 OTHER SEBORRHEIC KERATOSIS: ICD-10-CM

## 2022-08-23 DIAGNOSIS — D22 MELANOCYTIC NEVI: ICD-10-CM

## 2022-08-23 PROBLEM — D22.5 MELANOCYTIC NEVI OF TRUNK: Status: ACTIVE | Noted: 2022-08-23

## 2022-08-23 PROCEDURE — 99203 OFFICE O/P NEW LOW 30 MIN: CPT | Mod: 25

## 2022-08-23 PROCEDURE — 17003 DESTRUCT PREMALG LES 2-14: CPT

## 2022-08-23 PROCEDURE — 17000 DESTRUCT PREMALG LESION: CPT

## 2022-08-23 PROCEDURE — ? LIQUID NITROGEN

## 2022-08-23 PROCEDURE — ? COUNSELING

## 2022-08-23 ASSESSMENT — LOCATION SIMPLE DESCRIPTION DERM
LOCATION SIMPLE: LEFT CHEEK
LOCATION SIMPLE: LEFT TEMPLE
LOCATION SIMPLE: RIGHT UPPER BACK
LOCATION SIMPLE: RIGHT NOSE
LOCATION SIMPLE: NOSE
LOCATION SIMPLE: RIGHT TEMPLE

## 2022-08-23 ASSESSMENT — LOCATION DETAILED DESCRIPTION DERM
LOCATION DETAILED: RIGHT MEDIAL TEMPLE
LOCATION DETAILED: RIGHT NASAL SIDEWALL
LOCATION DETAILED: LEFT NASAL DORSUM
LOCATION DETAILED: RIGHT MID-UPPER BACK
LOCATION DETAILED: LEFT SUPERIOR CENTRAL MALAR CHEEK
LOCATION DETAILED: RIGHT INFERIOR UPPER BACK
LOCATION DETAILED: LEFT CENTRAL TEMPLE
LOCATION DETAILED: RIGHT SUPERIOR LATERAL UPPER BACK
LOCATION DETAILED: RIGHT LATERAL UPPER BACK

## 2022-08-23 ASSESSMENT — LOCATION ZONE DERM
LOCATION ZONE: NOSE
LOCATION ZONE: FACE
LOCATION ZONE: TRUNK

## 2022-08-23 NOTE — PROCEDURE: LIQUID NITROGEN
Number Of Freeze-Thaw Cycles: 1 freeze-thaw cycle
Render Post-Care Instructions In Note?: no
Detail Level: Detailed
Post-Care Instructions: I reviewed with the patient in detail post-care instructions. Patient is to wear sunprotection, and avoid picking at any of the treated lesions. Pt may apply Vaseline to crusted or scabbing areas.
Duration Of Freeze Thaw-Cycle (Seconds): 10
Show Applicator Variable?: Yes
Consent: The patient's consent was obtained including but not limited to risks of crusting, scabbing, blistering, scarring, darker or lighter pigmentary change, recurrence, incomplete removal and infection.

## 2025-04-15 ENCOUNTER — OFFICE VISIT (OUTPATIENT)
Dept: MEDICAL GROUP | Facility: PHYSICIAN GROUP | Age: 60
End: 2025-04-15
Payer: COMMERCIAL

## 2025-04-15 VITALS
TEMPERATURE: 97.7 F | SYSTOLIC BLOOD PRESSURE: 120 MMHG | HEART RATE: 60 BPM | BODY MASS INDEX: 29.14 KG/M2 | DIASTOLIC BLOOD PRESSURE: 70 MMHG | WEIGHT: 234.4 LBS | HEIGHT: 75 IN | RESPIRATION RATE: 14 BRPM | OXYGEN SATURATION: 95 %

## 2025-04-15 DIAGNOSIS — Z11.3 SCREEN FOR STD (SEXUALLY TRANSMITTED DISEASE): ICD-10-CM

## 2025-04-15 DIAGNOSIS — Z12.12 SCREENING FOR COLORECTAL CANCER: ICD-10-CM

## 2025-04-15 DIAGNOSIS — Z12.11 SCREENING FOR COLORECTAL CANCER: ICD-10-CM

## 2025-04-15 DIAGNOSIS — Z00.00 PHYSICAL EXAM, ANNUAL: ICD-10-CM

## 2025-04-15 DIAGNOSIS — N52.9 ERECTILE DYSFUNCTION, UNSPECIFIED ERECTILE DYSFUNCTION TYPE: ICD-10-CM

## 2025-04-15 PROCEDURE — 99203 OFFICE O/P NEW LOW 30 MIN: CPT | Performed by: PHYSICIAN ASSISTANT

## 2025-04-15 PROCEDURE — 3074F SYST BP LT 130 MM HG: CPT | Performed by: PHYSICIAN ASSISTANT

## 2025-04-15 PROCEDURE — 3078F DIAST BP <80 MM HG: CPT | Performed by: PHYSICIAN ASSISTANT

## 2025-04-15 RX ORDER — TADALAFIL 10 MG/1
10 TABLET ORAL
Qty: 90 TABLET | Refills: 0 | Status: SHIPPED | OUTPATIENT
Start: 2025-04-15

## 2025-04-15 ASSESSMENT — PATIENT HEALTH QUESTIONNAIRE - PHQ9: CLINICAL INTERPRETATION OF PHQ2 SCORE: 0

## 2025-04-15 NOTE — PROGRESS NOTES
CC:    Chief Complaint   Patient presents with    Establish Care       HISTORY OF THE PRESENT ILLNESS: Patient is a 59 y.o. male presenting to establish primary care     Pt will be retiring in about four months. Wanting to make sure he is in good health when he retires. Notes that he gets some achiness in his joints.   Had L hip replacement about five years ago. Continues to see ortho for bilateral knee pain especially when doing squats.   Concerned about testosterone levels. Also interested in ED medications.   Takes prilosec OTC for heartburn.   Last colonoscopy about ten years ago.     No problem-specific Assessment & Plan notes found for this encounter.    Allergies: Patient has no known allergies.    Current Outpatient Medications Ordered in Epic   Medication Sig Dispense Refill    OMEPRAZOLE PO Take  by mouth.       No current Epic-ordered facility-administered medications on file.       Past Medical History:   Diagnosis Date    Pain     Left shoulder pain       Past Surgical History:   Procedure Laterality Date    NC SHLDR ARTHROSCOP,PART ACROMIOPLAS Left 5/29/2020    Procedure: DECOMPRESSION, SHOULDER, ARTHROSCOPIC- MINI OPEN SUBACROMIAL, LATERAL CORACOID DECOMPRESSION;  Surgeon: Chris Stanley M.D.;  Location: [facility];  Service: Orthopedics    PB SHLDR ARTHROSCOP,SURG,W/ROTAT CUFF REPB Left 5/29/2020    Procedure: ARTHROSCOPY, SHOULDER, WITH ROTATOR CUFF REPAIR;  Surgeon: Chris Stanley M.D.;  Location: [facility];  Service: Orthopedics    PB ARTHROSCOPY SHOULDER SURGICAL BICEPS TENODES* Left 5/29/2020    Procedure: ARTHROSCOPY, SHOULDER, WITH BICEPS TENODESIS- HOLLIDAY;  Surgeon: Chris Stanley M.D.;  Location: [facility];  Service: Orthopedics    SHOULDER ARTHROSCOPY Left 5/29/2020    Procedure: ARTHROSCOPY, SHOULDER- WITH DEBRIDEMENT;  Surgeon: Crhis Stanley M.D.;  Location: [facility];  Service: Orthopedics    CLAVICLE DISTAL EXCISION  "Left 5/29/2020    Procedure: EXCISION, CLAVICLE, DISTAL;  Surgeon: Chris Stanley M.D.;  Location: SURGERY [facility];  Service: Orthopedics    ARTHROPLASTY Left 2017    SHOULDER ARTHROSCOPY Right 2015    CHOLECYSTECTOMY  2010       Social History     Tobacco Use    Smoking status: Never    Smokeless tobacco: Never   Vaping Use    Vaping status: Never Used   Substance Use Topics    Alcohol use: Yes    Drug use: Never       Social History     Social History Narrative    Not on file       No family history on file.    ROS:     - Constitutional: Negative for fever, chills, unexpected weight change, and fatigue/generalized weakness.     - HEENT: Negative for headaches, vision changes, hearing changes, ear pain, ear discharge, rhinorrhea, sinus congestion, sore throat, and neck pain.      - Respiratory: Negative for cough, sputum production, chest congestion, dyspnea, wheezing, and crackles.      - Cardiovascular: Negative for chest pain, palpitations, orthopnea, and bilateral lower extremity edema.     - Gastrointestinal:Positive for heartburn. Negative for nausea, vomiting, abdominal pain, hematochezia, melena, diarrhea, constipation, and greasy/foul-smelling stools.     - Genitourinary: Negative for dysuria, polyuria, hematuria, pyuria, urinary urgency, and urinary incontinence.     - Musculoskeletal: Positive for joint pains. Negative for myalgias, back pain,     - Skin: Negative for rash, itching, cyanotic skin color change.     - Neurological: Negative for dizziness, tingling, tremors, focal sensory deficit, focal weakness and headaches.     - Endo/Heme/Allergies: Does not bruise/bleed easily.     - Psychiatric/Behavioral: Negative for depression, suicidal/homicidal ideation and memory loss.            .      Exam: /70   Pulse 60   Temp 36.5 °C (97.7 °F) (Temporal)   Resp 14   Ht 1.905 m (6' 3\")   Wt 106 kg (234 lb 6.4 oz)   SpO2 95%  Body mass index is 29.3 kg/m².    General: Normal appearing. " No acute distress.  Skin: Warm and dry.  No obvious lesions.  HEENT: Normocephalic. Eyes conjunctiva clear lids without ptosis, ears normal shape and contour  Cardiovascular: Regular rate and rhythm without murmur.   Respiratory: Clear to auscultation bilaterally, no rhonchi wheezing or rales.  Neurologic: Grossly nonfocal, A&O x3, gait normal,  Musculoskeletal: No deformity or swelling.   Extremities: No extremity cyanosis, clubbing, or edema.  Psych: Normal mood and affect. Judgment and insight is normal.    Please note that this dictation was created using voice recognition software. I have made every reasonable attempt to correct obvious errors, but I expect that there are errors of grammar and possibly content that I did not discover before finalizing the note.      Assessment/Plan    1. Screening for colorectal cancer  Furl placed  - Referral to GI for Colonoscopy    2. Physical exam, annual  Labs printed  - CBC WITH DIFFERENTIAL; Future  - Comp Metabolic Panel; Future  - HEMOGLOBIN A1C; Future  - Lipid Profile; Future  - TSH WITH REFLEX TO FT4; Future  - Testosterone, Free & Total, Adult Male (w/SHBG); Future    3. Screen for STD (sexually transmitted disease)    - HIV AG/AB COMBO ASSAY SCREENING; Future    4. Erectile dysfunction, unspecified erectile dysfunction type  Trial on Cialis 10 mg.  - tadalafil (CIALIS) 10 MG tablet; Take 1 Tablet by mouth 1 time a day as needed for Erectile Dysfunction.  Dispense: 90 Tablet; Refill: 0

## 2025-04-22 NOTE — Clinical Note
REFERRAL APPROVAL NOTICE         Sent on April 22, 2025                   Fady Thornton  [address] [address]                   Dear Mr. Thornton,    After a careful review of the medical information and benefit coverage, [facility] has processed your referral. See below for additional details.    If applicable, you must be actively enrolled with your insurance for coverage of the authorized service. If you have any questions regarding your coverage, please contact your insurance directly.    REFERRAL INFORMATION   Referral #:  [id number]  Referred-To Provider    Referred-By Provider:  Gastroenterology    Irving Olsen P.A.-C.   [facility]      [address] [facility]  [address]  [facility] [id number] [phone] [address] [address]  [phone]    Referral Start Date:  04/15/2025  Referral End Date:   04/15/2026             SCHEDULING  If you do not already have an appointment, please call [phone] to make an appointment.     MORE INFORMATION  If you do not already have a [employer] account, sign up at: [email or web address]  You can access your medical information, make appointments, see lab results, billing information, and more.  If you have questions regarding this referral, please contact  the [facility] Referrals department at:             [phone]. Monday - Friday 8:00AM - 5:00PM.     Sincerely,    [facility]

## 2025-04-29 ENCOUNTER — HOSPITAL ENCOUNTER (OUTPATIENT)
Facility: MEDICAL CENTER | Age: 60
End: 2025-04-29
Attending: PHYSICIAN ASSISTANT
Payer: COMMERCIAL

## 2025-04-29 DIAGNOSIS — Z11.3 SCREEN FOR STD (SEXUALLY TRANSMITTED DISEASE): ICD-10-CM

## 2025-04-29 DIAGNOSIS — Z00.00 PHYSICAL EXAM, ANNUAL: ICD-10-CM

## 2025-04-29 LAB
ALBUMIN SERPL BCP-MCNC: 4.4 G/DL (ref 3.2–4.9)
ALBUMIN/GLOB SERPL: 1.8 G/DL
ALP SERPL-CCNC: 66 U/L (ref 30–99)
ALT SERPL-CCNC: 36 U/L (ref 2–50)
ANION GAP SERPL CALC-SCNC: 13 MMOL/L (ref 7–16)
AST SERPL-CCNC: 34 U/L (ref 12–45)
BASOPHILS # BLD AUTO: 1 % (ref 0–1.8)
BASOPHILS # BLD: 0.04 K/UL (ref 0–0.12)
BILIRUB SERPL-MCNC: 1.2 MG/DL (ref 0.1–1.5)
BUN SERPL-MCNC: 21 MG/DL (ref 8–22)
CALCIUM ALBUM COR SERPL-MCNC: 9.3 MG/DL (ref 8.5–10.5)
CALCIUM SERPL-MCNC: 9.6 MG/DL (ref 8.5–10.5)
CHLORIDE SERPL-SCNC: 105 MMOL/L (ref 96–112)
CHOLEST SERPL-MCNC: 196 MG/DL (ref 100–199)
CO2 SERPL-SCNC: 22 MMOL/L (ref 20–33)
CREAT SERPL-MCNC: 1.01 MG/DL (ref 0.5–1.4)
EOSINOPHIL # BLD AUTO: 0.07 K/UL (ref 0–0.51)
EOSINOPHIL NFR BLD: 1.7 % (ref 0–6.9)
ERYTHROCYTE [DISTWIDTH] IN BLOOD BY AUTOMATED COUNT: 43 FL (ref 35.9–50)
EST. AVERAGE GLUCOSE BLD GHB EST-MCNC: 91 MG/DL
FASTING STATUS PATIENT QL REPORTED: NORMAL
GFR SERPLBLD CREATININE-BSD FMLA CKD-EPI: 85 ML/MIN/1.73 M 2
GLOBULIN SER CALC-MCNC: 2.4 G/DL (ref 1.9–3.5)
GLUCOSE SERPL-MCNC: 95 MG/DL (ref 65–99)
HBA1C MFR BLD: 4.8 % (ref 4–5.6)
HCT VFR BLD AUTO: 44.6 % (ref 42–52)
HDLC SERPL-MCNC: 46 MG/DL
HGB BLD-MCNC: 15.8 G/DL (ref 14–18)
HIV 1+2 AB+HIV1 P24 AG SERPL QL IA: NORMAL
IMM GRANULOCYTES # BLD AUTO: 0.01 K/UL (ref 0–0.11)
IMM GRANULOCYTES NFR BLD AUTO: 0.2 % (ref 0–0.9)
LDLC SERPL CALC-MCNC: 136 MG/DL
LYMPHOCYTES # BLD AUTO: 1.11 K/UL (ref 1–4.8)
LYMPHOCYTES NFR BLD: 26.6 % (ref 22–41)
MCH RBC QN AUTO: 34.5 PG (ref 27–33)
MCHC RBC AUTO-ENTMCNC: 35.4 G/DL (ref 32.3–36.5)
MCV RBC AUTO: 97.4 FL (ref 81.4–97.8)
MONOCYTES # BLD AUTO: 0.38 K/UL (ref 0–0.85)
MONOCYTES NFR BLD AUTO: 9.1 % (ref 0–13.4)
NEUTROPHILS # BLD AUTO: 2.57 K/UL (ref 1.82–7.42)
NEUTROPHILS NFR BLD: 61.4 % (ref 44–72)
NRBC # BLD AUTO: 0 K/UL
NRBC BLD-RTO: 0 /100 WBC (ref 0–0.2)
PLATELET # BLD AUTO: 131 K/UL (ref 164–446)
PMV BLD AUTO: 11.2 FL (ref 9–12.9)
POTASSIUM SERPL-SCNC: 4.4 MMOL/L (ref 3.6–5.5)
PROT SERPL-MCNC: 6.8 G/DL (ref 6–8.2)
RBC # BLD AUTO: 4.58 M/UL (ref 4.7–6.1)
SODIUM SERPL-SCNC: 140 MMOL/L (ref 135–145)
TRIGL SERPL-MCNC: 70 MG/DL (ref 0–149)
TSH SERPL DL<=0.005 MIU/L-ACNC: 1.46 UIU/ML (ref 0.38–5.33)
WBC # BLD AUTO: 4.2 K/UL (ref 4.8–10.8)

## 2025-04-29 PROCEDURE — 84270 ASSAY OF SEX HORMONE GLOBUL: CPT

## 2025-04-29 PROCEDURE — 85025 COMPLETE CBC W/AUTO DIFF WBC: CPT

## 2025-04-29 PROCEDURE — 84403 ASSAY OF TOTAL TESTOSTERONE: CPT

## 2025-04-29 PROCEDURE — 80053 COMPREHEN METABOLIC PANEL: CPT

## 2025-04-29 PROCEDURE — 83036 HEMOGLOBIN GLYCOSYLATED A1C: CPT

## 2025-04-29 PROCEDURE — 36415 COLL VENOUS BLD VENIPUNCTURE: CPT

## 2025-04-29 PROCEDURE — 84402 ASSAY OF FREE TESTOSTERONE: CPT

## 2025-04-29 PROCEDURE — 87389 HIV-1 AG W/HIV-1&-2 AB AG IA: CPT

## 2025-04-29 PROCEDURE — 84443 ASSAY THYROID STIM HORMONE: CPT

## 2025-04-29 PROCEDURE — 80061 LIPID PANEL: CPT

## 2025-05-01 ENCOUNTER — RESULTS FOLLOW-UP (OUTPATIENT)
Dept: MEDICAL GROUP | Facility: PHYSICIAN GROUP | Age: 60
End: 2025-05-01

## 2025-05-01 LAB
SHBG SERPL-SCNC: 47 NMOL/L (ref 19–76)
TESTOST FREE MFR SERPL: 1.5 % (ref 1.6–2.9)
TESTOST FREE SERPL-MCNC: 71 PG/ML (ref 47–244)
TESTOST SERPL-MCNC: 468 NG/DL (ref 300–720)

## 2025-05-20 ENCOUNTER — OFFICE VISIT (OUTPATIENT)
Dept: MEDICAL GROUP | Facility: PHYSICIAN GROUP | Age: 60
End: 2025-05-20
Payer: COMMERCIAL

## 2025-05-20 VITALS
BODY MASS INDEX: 28.1 KG/M2 | DIASTOLIC BLOOD PRESSURE: 66 MMHG | RESPIRATION RATE: 16 BRPM | WEIGHT: 226 LBS | TEMPERATURE: 97.8 F | HEART RATE: 69 BPM | SYSTOLIC BLOOD PRESSURE: 94 MMHG | OXYGEN SATURATION: 94 % | HEIGHT: 75 IN

## 2025-05-20 DIAGNOSIS — D69.6 THROMBOCYTOPENIA (HCC): Primary | ICD-10-CM

## 2025-05-20 PROCEDURE — 99214 OFFICE O/P EST MOD 30 MIN: CPT | Performed by: PHYSICIAN ASSISTANT

## 2025-05-20 PROCEDURE — 3074F SYST BP LT 130 MM HG: CPT | Performed by: PHYSICIAN ASSISTANT

## 2025-05-20 PROCEDURE — 3078F DIAST BP <80 MM HG: CPT | Performed by: PHYSICIAN ASSISTANT

## 2025-05-20 RX ORDER — HYDROCODONE BITARTRATE AND ACETAMINOPHEN 10; 325 MG/1; MG/1
TABLET ORAL
COMMUNITY

## 2025-05-20 ASSESSMENT — FIBROSIS 4 INDEX: FIB4 SCORE: 2.6

## 2025-05-20 NOTE — PROGRESS NOTES
"CC:  Chief Complaint   Patient presents with    Lab Results       HISTORY OF PRESENT ILLNESS: Patient is a 59 y.o. male established patient presenting with issues below  Pt's platelets at 131.  Reports that cialis working quite well.     The 10-year ASCVD risk score (Kennedi HAGEN, et al., 2019) is: 5.3%    Current Medications[1]     ROS:     ROS    Exam:    BP 94/66   Pulse 69   Temp 36.6 °C (97.8 °F) (Temporal)   Resp 16   Ht 1.905 m (6' 3\")   Wt 103 kg (226 lb)   SpO2 94%  Body mass index is 28.25 kg/m².    General:  Well nourished, well developed male in NAD  Head is grossly normal.  Neck: Supple.   Skin: Warm and dry. No obvious lesions  Neuro: Normal muscle tone. Gait normal. Alert and oriented.  Psych: Normal mood and affect      Please note that this dictation was created using voice recognition software. I have made every reasonable attempt to correct obvious errors, but I expect that there are errors of grammar and possibly content that I did not discover before finalizing the note.        Assessment/Plan:    1. Thrombocytopenia (HCC) (Primary)  Will get additional labs and abdominal US.   - HEP C VIRUS ANTIBODY; Future  - HEP B CORE AB TEST,TOTAL; Future  - US-ABDOMEN COMPLETE SURVEY; Future                [1]   Current Outpatient Medications   Medication Sig Dispense Refill    OMEPRAZOLE PO Take  by mouth.      HYDROcodone/acetaminophen (NORCO)  MG Tab .5-1 tablet PO q 4 hrs prn for 5 days (Patient not taking: Reported on 5/20/2025)      tadalafil (CIALIS) 10 MG tablet Take 1 Tablet by mouth 1 time a day as needed for Erectile Dysfunction. (Patient not taking: Reported on 5/20/2025) 90 Tablet 0     No current facility-administered medications for this visit.     "

## 2025-09-02 ENCOUNTER — APPOINTMENT (OUTPATIENT)
Dept: RADIOLOGY | Facility: MEDICAL CENTER | Age: 60
End: 2025-09-02
Attending: PHYSICIAN ASSISTANT
Payer: COMMERCIAL

## (undated) DEVICE — CANNULA KIT UNIV GREY - (10/BX)

## (undated) DEVICE — STAPLER SKIN DISP - (6/BX 10BX/CA) VISISTAT

## (undated) DEVICE — BLADE SAGITTAL SAW 9.4MM X 25.5MM X .4MM FINE TOOTH (1/EA)

## (undated) DEVICE — CLOSURE SKIN STRIP 1/2 X 4 IN - (STERI STRIP) (50/BX 4BX/CA)

## (undated) DEVICE — TOWELS CLOTH SURGICAL - (4/PK 20PK/CA)

## (undated) DEVICE — DRAPE LARGE 3 QUARTER - (20/CA)

## (undated) DEVICE — STERI STRIP COMPOUND BENZOIN - TINCTURE 0.6ML WITH APPLICATOR (40EA/BX)

## (undated) DEVICE — GOWN WARMING STANDARD FLEX - (30/CA)

## (undated) DEVICE — GLOVE SZ 7.5 LF PROTEXIS (50PR/BX)

## (undated) DEVICE — SPIDER SHOULDER HOLDER (12EA/BX)

## (undated) DEVICE — SODIUM CHL IRRIGATION 0.9% 1000ML (12EA/CA)

## (undated) DEVICE — CHLORAPREP 26 ML APPLICATOR - ORANGE TINT(25/CA)

## (undated) DEVICE — BURR OVAL CUT 4.0 MM

## (undated) DEVICE — BLADE SURGICAL #15 - (50/BX 3BX/CA)

## (undated) DEVICE — DRAPE SURGICAL U 77X120 - (10/CA)

## (undated) DEVICE — GLOVE BIOGEL INDICATOR SZ 7.5 SURGICAL PF LTX - (50PR/BX 4BX/CA)

## (undated) DEVICE — DRAPETIBURON SHOULDER W/POUCH - (5EA/CA)

## (undated) DEVICE — ARTHROWAND TURBOVAC 3.5/90 SCT

## (undated) DEVICE — GLOVE SURGICAL PROTEXIS 8 1/2 - (50PR/BX)

## (undated) DEVICE — BURR ROUND 66MM

## (undated) DEVICE — SENSOR SPO2 NEO LNCS ADHESIVE (20/BX) SEE USER NOTES

## (undated) DEVICE — LACTATED RINGERS INJ 1000 ML - (14EA/CA 60CA/PF)

## (undated) DEVICE — ELECTRODE DUAL RETURN W/ CORD - (50/PK)

## (undated) DEVICE — BAG SPONGE COUNT 10.25 X 32 - BLUE (250/CA)

## (undated) DEVICE — PROTECTOR ULNA NERVE - (36PR/CA)

## (undated) DEVICE — GLOVE PROTEXIS PI MICRO SZ 8.5 (200PR/CA)

## (undated) DEVICE — SUTURE 4-0 ETHILON FS-2 18 (36PK/BX)"

## (undated) DEVICE — SYRINGE ASEPTO - (50EA/CA

## (undated) DEVICE — SUTURE 0 VICRYL PLUS CT-1 - 36 INCH (36/BX)

## (undated) DEVICE — WATER IRRIGATION STERILE 1000ML (12EA/CA)

## (undated) DEVICE — FIBERWIRE 2-0 - 12/BX

## (undated) DEVICE — PACK SHOULDER ARTHROSCOPY SM - (2EA/CA)

## (undated) DEVICE — KIT ANESTHESIA W/CIRCUIT & 3/LT BAG W/FILTER (20EA/CA)

## (undated) DEVICE — TUBE CONNECTING SUCTION - CLEAR PLASTIC STERILE 72 IN (50EA/CA)

## (undated) DEVICE — CANISTER SUCTION RIGID RED 1500CC (40EA/CA)

## (undated) DEVICE — SUTURE 1 PDS-2 PLUS CTX - (24/BX)

## (undated) DEVICE — SUTURE 3-0 MONOCRYL PLUS PS-1 - 27 INCH (36/BX)

## (undated) DEVICE — ELECTRODE 850 FOAM ADHESIVE - HYDROGEL RADIOTRNSPRNT (50/PK)

## (undated) DEVICE — SUCTION TIP STRAIGHT ARGYLE - 50EA/CA

## (undated) DEVICE — SHAVER4.0 AGGRESSIVE + FORMLA (5EA/BX)

## (undated) DEVICE — BOVIE NEEDLE TIP INSULATD NON-SAFETY 2CM (50/PK)

## (undated) DEVICE — SLING

## (undated) DEVICE — HEAD HOLDER JUNIOR/ADULT

## (undated) DEVICE — MASK ANESTHESIA ADULT  - (100/CA)

## (undated) DEVICE — SUTURE PASSER SM CONCEPT - 6/BX

## (undated) DEVICE — Device

## (undated) DEVICE — TUBING PATIENT W/CONNECTOR REDEUCE (1EA)

## (undated) DEVICE — SUTURE GENERAL

## (undated) DEVICE — GLOVE, LITE (PAIR)

## (undated) DEVICE — PENCIL ELECTSURG 10FT BTN SWH - (50/CA)

## (undated) DEVICE — TUBING PUMP WITH CONNECTOR REDEUCE (1EA)

## (undated) DEVICE — NEPTUNE 4 PORT MANIFOLD - (20/PK)

## (undated) DEVICE — SUCTION INSTRUMENT YANKAUER BULBOUS TIP W/O VENT (50EA/CA)

## (undated) DEVICE — HUMID-VENT HEAT AND MOISTURE EXCHANGE- (50/BX)

## (undated) DEVICE — SLEEVE STERILE  A599T - 30/BX 2BX/CS

## (undated) DEVICE — SODIUM CHL. IRRIGATION 0.9% 3000ML (4EA/CA 65CA/PF)